# Patient Record
Sex: MALE | Race: WHITE
[De-identification: names, ages, dates, MRNs, and addresses within clinical notes are randomized per-mention and may not be internally consistent; named-entity substitution may affect disease eponyms.]

---

## 2019-03-14 ENCOUNTER — HOSPITAL ENCOUNTER (EMERGENCY)
Dept: HOSPITAL 97 - ER | Age: 54
Discharge: HOME | End: 2019-03-14
Payer: COMMERCIAL

## 2019-03-14 DIAGNOSIS — N20.1: Primary | ICD-10-CM

## 2019-03-14 LAB — UA COMPLETE W REFLEX CULTURE PNL UR: (no result)

## 2019-03-14 PROCEDURE — 81015 MICROSCOPIC EXAM OF URINE: CPT

## 2019-03-14 PROCEDURE — 74176 CT ABD & PELVIS W/O CONTRAST: CPT

## 2019-03-14 PROCEDURE — 81003 URINALYSIS AUTO W/O SCOPE: CPT

## 2019-03-14 PROCEDURE — 99284 EMERGENCY DEPT VISIT MOD MDM: CPT

## 2019-03-14 PROCEDURE — 76377 3D RENDER W/INTRP POSTPROCES: CPT

## 2019-03-14 NOTE — ER
Nurse's Notes                                                                                     

 [facility]                                                                

Name: Sarath Hilton                                                                               

Age: 54 yrs                                                                                       

Sex: Male                                                                                         

: 1965                                                                                   

MRN: [id number]                                                                                   

Arrival Date: 2019                                                                          

Time: 17:15                                                                                       

Account#: [id number]                                                                            

Bed 8                                                                                             

Private MD:                                                                                       

Diagnosis: Calculus of ureter                                                                     

                                                                                                  

Presentation:                                                                                     

                                                                                             

17:57 Presenting complaint: Patient states: L side pain that began this morning, burning pain ph  

      in L groin area, also reports nausea, denies vomiting or fever, also denies urinary         

      symptoms. Transition of care: patient was not received from another setting of care.        

      Onset of symptoms was 2019. Risk Assessment: Do you want to hurt yourself or      

      someone else? Patient reports no desire to harm self or others. Initial Sepsis Screen:      

      Does the patient meet any 2 criteria? No. Patient's initial sepsis screen is negative.      

      Does the patient have a suspected source of infection? No. Patient's initial sepsis         

      screen is negative. Care prior to arrival: None.                                            

17:57 Method Of Arrival: Ambulatory                                                             

17:57 Acuity: MARCE 3                                                                           ph  

                                                                                                  

Triage Assessment:                                                                                

20:47 General: Appears in no apparent distress. Behavior is calm, cooperative.                ak1 

20:56 Pain: Denies pain. EENT: No signs and/or symptoms were reported regarding the EENT      ak1 

      system. Neuro: No deficits noted. Cardiovascular: No deficits noted. Respiratory: No        

      deficits noted. GI: No signs and/or symptoms were reported involving the                    

      gastrointestinal system. : Reports burning sensation to groin. Derm: No signs and/or      

      symptoms reported regarding the dermatologic system. Musculoskeletal: No signs and/or       

      symptoms reported regarding the musculoskeletal system.                                     

                                                                                                  

Historical:                                                                                       

- Allergies:                                                                                      

18:01 No Known Allergies;                                                                     ph  

- Home Meds:                                                                                      

18:01 None [Active];                                                                          ph  

- PMHx:                                                                                           

18:01 None;                                                                                   ph  

- PSHx:                                                                                           

18:01 None;                                                                                   ph  

                                                                                                  

- Immunization history:: Adult Immunizations unknown.                                             

- Social history:: Smoking status: unknown.                                                       

- Ebola Screening: : No symptoms or risks identified at this time.                                

                                                                                                  

                                                                                                  

Screenin:46 Abuse screen: Denies threats or abuse. Denies injuries from another. Nutritional        ak1 

      screening: No deficits noted. Tuberculosis screening: No symptoms or risk factors           

      identified. Fall Risk None identified.                                                      

                                                                                                  

Vital Signs:                                                                                      

18:00  / 102; Pulse 83; Resp 18; Temp 98.0; Pulse Ox 99% on R/A; Weight 122.47 kg;      ph  

      Height 5 ft. 10 in. (177.80 cm); Pain 5/10;                                                 

20:56  / 101; Pulse 87; Resp 18; Temp 98.2(O); Pulse Ox 97% on R/A; Pain 1/10;          ak1 

18:00 Body Mass Index 38.74 (122.47 kg, 177.80 cm)                                            ph  

                                                                                                  

ED Course:                                                                                        

17:15 Patient arrived in ED.                                                                  tw3 

18:00 Triage completed.                                                                       ph  

18:01 Arm band placed on Patient placed in waiting room.                                      ph  

19:05 Patient moved to CT via wheelchair.                                                     [id number] 

19:19 CT Stone Protocol In Process Unspecified.                                               EDMS

20:37 Royce Mayer MD is Attending Physician.                                                

20:43 Tamara Thompson MD is Referral Physician.                                                

20:46 Arianna Laura, RN is Primary Nurse.                                                     ak1 

20:47 Patient has correct armband on for positive identification. Bed in low position. Call   ak1 

      light in reach. Side rails up X 1. Pulse ox on. NIBP on.                                    

20:58 No provider procedures requiring assistance completed. Patient did not have IV access   ak1 

      during this emergency room visit.                                                           

                                                                                                  

Administered Medications:                                                                         

No medications were administered                                                                  

                                                                                                  

                                                                                                  

Outcome:                                                                                          

20:43 Discharge ordered by MD.                                                                  

20:58 Discharged to home ambulatory.                                                          ak1 

20:58 Condition: good                                                                             

20:58 Discharge instructions given to patient, family, Instructed on discharge instructions,      

      follow up and referral plans. Demonstrated understanding of instructions, follow-up         

      care, medications, Prescriptions given X 1.                                                 

20:59 Patient left the ED.                                                                    ak1 

                                                                                                  

Signatures:                                                                                       

Dispatcher [employer]                           EDMS                                                 

Arianna Laura, RN                       RN   ak1                                                  

Astrid Patricio, PREMA                      RN                                                      

Júnior, Jayne                                    tw3                                                  

Royce Mayer MD MD gs Garcia, Jessica                              j                                                  

                                                                                                  

**************************************************************************************************

## 2019-03-14 NOTE — EDPHYS
Physician Documentation                                                                           

 [facility]                                                                

Name: Sarath Hilton                                                                               

Age: 54 yrs                                                                                       

Sex: Male                                                                                         

: 1965                                                                                   

MRN: [id number]                                                                                   

Arrival Date: 2019                                                                          

Time: 17:15                                                                                       

Account#: [id number]                                                                            

Bed 8                                                                                             

Private MD:                                                                                       

ED Physician Royce Mayer                                                                       

HPI:                                                                                              

                                                                                             

23:23 This 54 yrs old  Male presents to ER via Ambulatory with complaints of Side    gs  

      Pain.                                                                                       

23:23 The patient complains of pain in the left low back and left mid back. Onset: The        gs  

      symptoms/episode began/occurred acutely, today. Modifying factors: The symptoms are         

      alleviated by nothing. the symptoms are aggravated by nothing. Associated signs and         

      symptoms: Pertinent negatives: hematuria. Severity of pain: At its worst the pain was       

      severe in the emergency department the pain has resolved. The patient has experienced a     

      previous episode. The patient has not recently seen a physician.                            

                                                                                                  

Historical:                                                                                       

- Allergies:                                                                                      

18:01 No Known Allergies;                                                                     ph  

- Home Meds:                                                                                      

18:01 None [Active];                                                                          ph  

- PMHx:                                                                                           

18:01 None;                                                                                   ph  

- PSHx:                                                                                           

18:01 None;                                                                                   ph  

                                                                                                  

- Immunization history:: Adult Immunizations unknown.                                             

- Social history:: Smoking status: unknown.                                                       

- Ebola Screening: : No symptoms or risks identified at this time.                                

                                                                                                  

                                                                                                  

ROS:                                                                                              

23:23 All other systems are negative.                                                         gs  

                                                                                                  

Exam:                                                                                             

23:23 Head/Face:  Normocephalic, atraumatic. Eyes:  Pupils equal round and reactive to light, gs  

      extra-ocular motions intact.  Lids and lashes normal.  Conjunctiva and sclera are           

      non-icteric and not injected.  Cornea within normal limits.  Periorbital areas with no      

      swelling, redness, or edema. ENT:  Nares patent. No nasal discharge, no septal              

      abnormalities noted.  Tympanic membranes are normal and external auditory canals are        

      clear.  Oropharynx with no redness, swelling, or masses, exudates, or evidence of           

      obstruction, uvula midline.  Mucous membranes moist. Neck:  Trachea midline, no             

      thyromegaly or masses palpated, and no cervical lymphadenopathy.  Supple, full range of     

      motion without nuchal rigidity, or vertebral point tenderness.  No Meningismus.             

      Chest/axilla:  Normal chest wall appearance and motion.  Nontender with no deformity.       

      No lesions are appreciated. Cardiovascular:  Regular rate and rhythm with a normal S1       

      and S2.  No gallops, murmurs, or rubs.  Normal PMI, no JVD.  No pulse deficits.             

      Respiratory:  Lungs have equal breath sounds bilaterally, clear to auscultation and         

      percussion.  No rales, rhonchi or wheezes noted.  No increased work of breathing, no        

      retractions or nasal flaring. Abdomen/GI:  Soft, non-tender, with normal bowel sounds.      

      No distension or tympany.  No guarding or rebound.  No evidence of tenderness               

      throughout. Back:  No spinal tenderness.  No costovertebral tenderness.  Full range of      

      motion. Skin:  Warm, dry with normal turgor.  Normal color with no rashes, no lesions,      

      and no evidence of cellulitis. MS/ Extremity:  Pulses equal, no cyanosis.                   

      Neurovascular intact.  Full, normal range of motion. Neuro:  Awake and alert, GCS 15,       

      oriented to person, place, time, and situation.  Cranial nerves II-XII grossly intact.      

      Motor strength 5/5 in all extremities.  Sensory grossly intact.  Cerebellar exam            

      normal.  Normal gait.                                                                       

23:23 Constitutional: The patient appears alert, awake.                                           

                                                                                                  

Vital Signs:                                                                                      

18:00  / 102; Pulse 83; Resp 18; Temp 98.0; Pulse Ox 99% on R/A; Weight 122.47 kg;      ph  

      Height 5 ft. 10 in. (177.80 cm); Pain 5/10;                                                 

20:56  / 101; Pulse 87; Resp 18; Temp 98.2(O); Pulse Ox 97% on R/A; Pain 1/10;          ak1 

18:00 Body Mass Index 38.74 (122.47 kg, 177.80 cm)                                            ph  

                                                                                                  

MDM:                                                                                              

20:42 Patient medically screened.                                                             gs  

23:23 Differential diagnosis: nephrolithiasis, pyelonephritis, UTI. Data reviewed: vital      gs  

      signs, nurses notes. Counseling: I had a detailed discussion with the patient and/or        

      guardian regarding: the historical points, exam findings, and any diagnostic results        

      supporting the discharge/admit diagnosis, the presence of at least one elevated blood       

      pressure reading (>120/80) during this emergency department visit. Response to              

      treatment: the patient's symptoms have markedly improved after treatment, the patient's     

      symptoms have resolved after treatment, the patient's condition has returned to base        

      line. Special discussion: I have referred the patient to see his PCP for further            

      evaluation of high blood pressure.                                                          

                                                                                                  

                                                                                             

19:04 Order name: Urine Dipstick--Ancillary (enter results); Complete Time: 20:45             bd  

                                                                                             

20:37 Order name: Urine Microscopic Only                                                      mw2 

                                                                                             

19:03 Order name: CT Stone Protocol; Complete Time: 20:37                                     ph  

                                                                                             

20:45 Interpretation: Abnormal: Per Radiologist's finding(s): [place] [id number]                     

      RADIOLOGY SERVICES REPORT                Name: SARATH HILTON RAHUL        

       Acct Number: [id number] :1965 Age:54 Sex:M Ord Phys: Nevaeh Kapoor FN      

           Unit Number: [id number]     Prim Care Dr: Brando Molina MD Status: REG     

      ER ER  Accession #: [id number]         Exam Date: 19  EXAM              

      DESCRIPTION: CT - Stone Protocol - 3/14/2019 7:19 pm   CLINICAL HISTORY: Flank          

      pain. FLANK PAIN   COMPARISON: No comparisons   TECHNIQUE: Axial images were         

      obtained without oral or IV contrast. Lack of contrast limits solid organ and vascular      

      assessment. The field-of-view spans the entirety of the  system partially obscuring       

      uppermost abdomen and lung bases. Coronal reformatted images were obtained and              

      reviewed.   All CT scans are performed using dose optimization technique as               

      appropriate and may include automated exposure control or mA/KV adjustment according to     

      patient size.   FINDINGS: The lower lung fields are clear.   Imaged portions of        

      the liver and spleen show no suspicious findings on non-contrast imaging. The pancreas      

      and adrenal glands are normal. No pathologic lymphadenopathy in the abdomen or pelvis.      

        6 mm stone (714 HU) is present in the proximal left ureter with mild left               

      hydronephrosis. Punctate calculus is present in the inferior calyx of the left kidney.      

      12 mm cyst is present in the posterosuperior cortex left kidney.   No bowel               

      obstruction, free air, free fluid or abscess. Normal appendix noted.Sigmoid                 

      diverticulosis is present without diverticulitis.   No significant bony abnormality.      

        IMPRESSION: 6 mm stone is present in the proximal left ureter with mild left           

      hydronephrosis.      Signed By: Missael Oswald MD    Signed AT: 19       

         .                                                                                     

                                                                                                  

Administered Medications:                                                                         

No medications were administered                                                                  

                                                                                                  

                                                                                                  

Disposition:                                                                                      

19 20:43 Discharged to Home. Impression: Calculus of ureter.                                

- Condition is Stable.                                                                            

- Discharge Instructions: Kidney Stones.                                                          

- Prescriptions for Tylenol- Codeine #4 300-60 mg Oral Tablet - take 1 tablet by ORAL             

  route every 6 hours As needed; 10 tablet.                                                       

- Medication Reconciliation Form, Thank You Letter, Antibiotic Education, Prescription            

  Opioid Use form.                                                                                

- Follow up: Private Physician; When: 2 - 3 days; Reason: Re-evaluation by your                   

  physician. Follow up: Tamara Thompson MD; When: 2 - 3 days; Reason: Re-evaluation by           

  your physician.                                                                                 

                                                                                                  

                                                                                                  

                                                                                                  

Signatures:                                                                                       

Dispatcher [employer]                           EDMS                                                 

Arianna Laura RN                       RN   ak1                                                  

Astrid Patricio RN                      RN                                                      

Royce Mayer MD MD                                                      

                                                                                                  

Corrections: (The following items were deleted from the chart)                                    

20:46 20:45 Per Radiologist's finding(s): [place] [id number]                 RADIOLOGY SERVICES REPORT       

                   Name: SARATH HILTON RAHUL     Acct Number: [id number]       

      :1965 Age:54 Sex:M Ord Phys: Nevaeh Kapoor ANGIE      Unit Number:          

      [id number]     Prim Care Dr: Brando Molina MD Status: REG  ER  Accession #:       

      [id number]         Exam Date: 19  EXAM DESCRIPTION: CT - Stone           

      Protocol - 3/14/2019 7:19 pm   CLINICAL HISTORY: Flank pain. FLANK PAIN               

      COMPARISON: No comparisons   TECHNIQUE: Axial images were obtained without oral or      

      IV contrast. Lack of contrast limits solid organ and vascular assessment. The               

      field-of-view spans the entirety of the  system partially obscuring uppermost abdomen     

      and lung bases. Coronal reformatted images were obtained and reviewed.   All CT scans     

      are performed using dose optimization technique as appropriate and may include              

      automated exposure control or mA/KV adjustment according to patient size.   FINDINGS:     

      The lower lung fields are clear.   Imaged portions of the liver and spleen show no       

      suspicious findings on non-contrast imaging. The pancreas and adrenal glands are            

      normal. No pathologic lymphadenopathy in the abdomen or pelvis.   6 mm stone (714 HU)     

      is present in the proximal left ureter with mild left hydronephrosis. Punctate calculus     

      is present in the inferior calyx of the left kidney. 12 mm cyst is present in the           

      posterosuperior cortex left kidney.   No bowel obstruction, free air, free fluid or       

      abscess. Normal appendix noted.Sigmoid diverticulosis is present without                    

      diverticulitis.   No significant bony abnormality.   IMPRESSION: 6 mm stone is         

      present in the proximal left ureter with mild left hydronephrosis.      Signed        

      By: Missael Oswald MD    Signed AT: 19      .                             

20:59 20:43 2019 20:43 Discharged to Home. Impression: Calculus of ureter. Condition is ak1 

      Stable. Forms are Medication Reconciliation Form, Thank You Letter, Antibiotic              

      Education, Prescription Opioid Use. Follow up: Private Physician; When: 2 - 3 days;         

      Reason: Re-evaluation by your physician. Follow up: Tamara Thompson; When: 2 - 3 days;       

      Reason: Re-evaluation by your physician. gs                                                 

                                                                                                  

**************************************************************************************************
chest pain

## 2019-03-14 NOTE — RAD REPORT
EXAM DESCRIPTION:  CT - Stone Protocol - 3/14/2019 7:19 pm

 

CLINICAL HISTORY:  Flank pain.

FLANK PAIN

 

COMPARISON:  No comparisons

 

TECHNIQUE:  Axial images were obtained without oral or IV contrast. Lack of contrast limits solid org
an and vascular assessment. The field-of-view spans the entirety of the  system partially obscuring
 uppermost abdomen and lung bases. Coronal reformatted images were obtained and reviewed.

 

All CT scans are performed using dose optimization technique as appropriate and may include automated
 exposure control or mA/KV adjustment according to patient size.

 

FINDINGS:  The lower lung fields are clear.

 

Imaged portions of the liver and spleen show no suspicious findings on non-contrast imaging. The panc
reas and adrenal glands are normal. No pathologic lymphadenopathy in the abdomen or pelvis.

 

6 mm stone (714 HU) is present in the proximal left ureter with mild left hydronephrosis. Punctate ca
lculus is present in the inferior calyx of the left kidney. 12 mm cyst is present in the posterosuper
ior cortex left kidney.

 

No bowel obstruction, free air, free fluid or abscess. Normal appendix noted.Sigmoid diverticulosis i
s present without diverticulitis.

 

No significant bony abnormality.

 

IMPRESSION:  6 mm stone is present in the proximal left ureter with mild left hydronephrosis.